# Patient Record
Sex: FEMALE
[De-identification: names, ages, dates, MRNs, and addresses within clinical notes are randomized per-mention and may not be internally consistent; named-entity substitution may affect disease eponyms.]

---

## 2022-08-08 PROBLEM — Z00.00 ENCOUNTER FOR PREVENTIVE HEALTH EXAMINATION: Status: ACTIVE | Noted: 2022-08-08

## 2022-08-10 ENCOUNTER — APPOINTMENT (OUTPATIENT)
Dept: OTOLARYNGOLOGY | Facility: CLINIC | Age: 62
End: 2022-08-10

## 2022-08-10 VITALS — BODY MASS INDEX: 30.73 KG/M2 | HEIGHT: 64 IN | TEMPERATURE: 97.4 F | WEIGHT: 180 LBS

## 2022-08-10 DIAGNOSIS — H90.3 SENSORINEURAL HEARING LOSS, BILATERAL: ICD-10-CM

## 2022-08-10 DIAGNOSIS — M19.90 UNSPECIFIED OSTEOARTHRITIS, UNSPECIFIED SITE: ICD-10-CM

## 2022-08-10 DIAGNOSIS — Z78.9 OTHER SPECIFIED HEALTH STATUS: ICD-10-CM

## 2022-08-10 DIAGNOSIS — Z83.3 FAMILY HISTORY OF DIABETES MELLITUS: ICD-10-CM

## 2022-08-10 DIAGNOSIS — H61.23 IMPACTED CERUMEN, BILATERAL: ICD-10-CM

## 2022-08-10 PROCEDURE — 69210 REMOVE IMPACTED EAR WAX UNI: CPT

## 2022-08-10 PROCEDURE — 99203 OFFICE O/P NEW LOW 30 MIN: CPT | Mod: 25

## 2022-08-10 PROCEDURE — 92557 COMPREHENSIVE HEARING TEST: CPT

## 2022-08-10 PROCEDURE — 92567 TYMPANOMETRY: CPT

## 2022-08-10 RX ORDER — UBIDECARENONE/VIT E ACET 100MG-5
CAPSULE ORAL
Refills: 0 | Status: ACTIVE | COMMUNITY

## 2022-08-10 RX ORDER — CYANOCOBALAMIN (VITAMIN B-12) 1000 MCG
TABLET ORAL
Refills: 0 | Status: ACTIVE | COMMUNITY

## 2022-08-10 NOTE — HISTORY OF PRESENT ILLNESS
[de-identified] : GRETCHEN IRWIN HARADA is a 61 year old patient here for follow-up for hearing loss.  She feels like her hearing has gotten worse.  She had mild eustachian tube dysfunction last week but no otalgia, otorrhea, tinnitus, or dizziness. \par \par She had recurrent middle ear infections as a child but no history of prior otologic surgery or ear trauma.\par She does have some noise exposure in the past.  There is a family history of hearing loss\par \par MRI in 2011 of the IACs was negative for lesions\par

## 2022-08-11 PROBLEM — H90.3 SENSORINEURAL HEARING LOSS (SNHL) OF BOTH EARS: Status: ACTIVE | Noted: 2022-08-10

## 2024-08-30 ENCOUNTER — APPOINTMENT (OUTPATIENT)
Dept: OTOLARYNGOLOGY | Facility: CLINIC | Age: 64
End: 2024-08-30
Payer: COMMERCIAL

## 2024-08-30 ENCOUNTER — APPOINTMENT (OUTPATIENT)
Dept: OTOLARYNGOLOGY | Facility: CLINIC | Age: 64
End: 2024-08-30

## 2024-08-30 VITALS — HEIGHT: 64 IN | WEIGHT: 180 LBS | BODY MASS INDEX: 30.73 KG/M2

## 2024-08-30 DIAGNOSIS — H61.23 IMPACTED CERUMEN, BILATERAL: ICD-10-CM

## 2024-08-30 DIAGNOSIS — H90.3 SENSORINEURAL HEARING LOSS, BILATERAL: ICD-10-CM

## 2024-08-30 PROCEDURE — 99213 OFFICE O/P EST LOW 20 MIN: CPT | Mod: 25

## 2024-08-30 PROCEDURE — 92557 COMPREHENSIVE HEARING TEST: CPT

## 2024-08-30 PROCEDURE — 69210 REMOVE IMPACTED EAR WAX UNI: CPT

## 2024-08-30 PROCEDURE — 92567 TYMPANOMETRY: CPT

## 2024-08-30 RX ORDER — MELOXICAM 15 MG/1
TABLET ORAL
Refills: 0 | Status: ACTIVE | COMMUNITY

## 2024-08-30 NOTE — PHYSICAL EXAM
[TextEntry] : PHYSICAL EXAM  General: The patient was alert, oriented and in no distress. Voice was clear.  Face: The patient had no facial asymmetry or mass. The skin was unremarkable.  Ears: Hearing normal to conversational voice External ears were normal without deformity. Ear canals- cerumen impaction  PROCEDURE- EAR MICROSCOPY AND CERUMEN REMOVAL  Indication: cerumen removal  Under the microscope, obstructing cerumen was removed atraumatically from the both ears with a suction, curette and/or forceps.  Canals: normal. no inflammation.  Tympanic membranes: Intact. no perforation or effusion.  Nose:  The external nose had no significant deformity. On anterior rhinoscopy, the nasal mucosa was clear.  The anterior septum was grossly midline. There were no visualized polyps, purulence  or masses.   Oral cavity: Oral mucosa- normal. Oral and base of tongue- clear and without mass. Gingival and buccal mucosa- moist and without lesions. Palate- the palate moved well. There was no cleft palate. There appeared to be good salivary flow.   Oral cavity/oropharynx- no pus, erythema or mass  Neck:  The neck was symmetrical. The parotid and submandibular glands were normal without masses. The trachea was midline and there was no unusual crepitus. Thyroid was smooth and nontender and no masses were palpated. No masses  Lymphatics: Cervical adenopathy- none.    AUDIOGRAM- test ordered and the results reviewed and discussed with the patient.  It was compared to her prior audiogram Hearing-bilateral sensorineural hearing loss with asymmetry in the left ear Word recognition-right ear-100%.  Left ear-80% Tympanograms- AD- type A, AS- type A

## 2024-08-30 NOTE — ASSESSMENT
[FreeTextEntry1] : She had cerumen impaction bilaterally which was removed.  Her ears were otherwise normal on exam.  Repeat audiogram showed bilateral sensorineural hearing loss with asymmetry in the left ear.  She had normal tympanograms  PLAN  -findings and management options discussed in detail with the patient.  Possible etiologies of ear pain were discussed -good aural hygiene -avoid using cotton swabs in the ears -wax removal drops as needed.  -noise precautions -annual audiogram -consider HAE - If she has persistent ear pain, she will return for further workup.  This will include nasal endoscopy and flexible laryngoscopy. -follow up if she has persistent symptoms.  Otherwise, follow-up in 1 year -call and return earlier if any concerns.